# Patient Record
Sex: MALE | Race: BLACK OR AFRICAN AMERICAN | Employment: STUDENT | URBAN - NONMETROPOLITAN AREA
[De-identification: names, ages, dates, MRNs, and addresses within clinical notes are randomized per-mention and may not be internally consistent; named-entity substitution may affect disease eponyms.]

---

## 2017-02-28 DIAGNOSIS — S76.311D HAMSTRING STRAIN, RIGHT, SUBSEQUENT ENCOUNTER: Primary | ICD-10-CM

## 2017-04-07 ENCOUNTER — HOSPITAL ENCOUNTER (OUTPATIENT)
Dept: PHYSICAL THERAPY | Age: 22
Discharge: HOME | End: 2017-04-07
Admitting: ORTHOPAEDIC SURGERY

## 2017-04-07 ENCOUNTER — HOSPITAL ENCOUNTER (OUTPATIENT)
Dept: OTHER | Age: 22
Discharge: HOME | End: 2017-04-30
Attending: ORTHOPAEDIC SURGERY | Admitting: ORTHOPAEDIC SURGERY

## 2017-04-10 ENCOUNTER — HOSPITAL ENCOUNTER (OUTPATIENT)
Dept: PHYSICAL THERAPY | Age: 22
Discharge: HOME | End: 2017-04-10
Admitting: ORTHOPAEDIC SURGERY

## 2017-04-14 ENCOUNTER — HOSPITAL ENCOUNTER (OUTPATIENT)
Dept: PHYSICAL THERAPY | Age: 22
Discharge: HOME | End: 2017-04-14
Admitting: ORTHOPAEDIC SURGERY

## 2017-04-21 ENCOUNTER — HOSPITAL ENCOUNTER (OUTPATIENT)
Dept: PHYSICAL THERAPY | Age: 22
Discharge: HOME | End: 2017-04-21
Admitting: ORTHOPAEDIC SURGERY

## 2023-07-12 ENCOUNTER — OFFICE VISIT (OUTPATIENT)
Dept: FAMILY MEDICINE | Facility: CLINIC | Age: 28
End: 2023-07-12
Payer: COMMERCIAL

## 2023-07-12 VITALS
RESPIRATION RATE: 14 BRPM | OXYGEN SATURATION: 99 % | SYSTOLIC BLOOD PRESSURE: 122 MMHG | TEMPERATURE: 98.1 F | BODY MASS INDEX: 23.84 KG/M2 | WEIGHT: 166.5 LBS | HEART RATE: 78 BPM | DIASTOLIC BLOOD PRESSURE: 75 MMHG | HEIGHT: 70 IN

## 2023-07-12 DIAGNOSIS — Z11.3 ROUTINE SCREENING FOR STI (SEXUALLY TRANSMITTED INFECTION): Primary | ICD-10-CM

## 2023-07-12 DIAGNOSIS — Z13.220 LIPID SCREENING: ICD-10-CM

## 2023-07-12 DIAGNOSIS — Z00.00 ROUTINE GENERAL MEDICAL EXAMINATION AT A HEALTH CARE FACILITY: ICD-10-CM

## 2023-07-12 PROCEDURE — 99385 PREV VISIT NEW AGE 18-39: CPT | Performed by: FAMILY MEDICINE

## 2023-07-12 ASSESSMENT — ENCOUNTER SYMPTOMS
ABDOMINAL PAIN: 0
PALPITATIONS: 0
ARTHRALGIAS: 0
DIZZINESS: 0
DIARRHEA: 0
WEAKNESS: 0
CONSTIPATION: 0
HEMATOCHEZIA: 0
FEVER: 0
SHORTNESS OF BREATH: 0
HEARTBURN: 0
SORE THROAT: 0
MYALGIAS: 0
NAUSEA: 0
CHILLS: 0
PARESTHESIAS: 0
HEMATURIA: 0
COUGH: 0
DYSURIA: 0
NERVOUS/ANXIOUS: 0
HEADACHES: 0
EYE PAIN: 0
JOINT SWELLING: 0
FREQUENCY: 0

## 2023-07-12 NOTE — PROGRESS NOTES
SUBJECTIVE:   CC: Niyah is an 27 year old who presents for preventative health visit.       7/12/2023     3:04 PM   Additional Questions   Roomed by Cinthia     Healthy Habits:     Getting at least 3 servings of Calcium per day:  Yes    Bi-annual eye exam:  NO    Dental care twice a year:  NO    Sleep apnea or symptoms of sleep apnea:  None    Diet:  Regular (no restrictions)    Frequency of exercise:  2-3 days/week    Duration of exercise:  30-45 minutes    Taking medications regularly:  Yes    Medication side effects:  Not applicable    Additional concerns today:  No        Today's PHQ-2 Score:       7/12/2023     2:52 PM   PHQ-2 ( 1999 [employer])   Q1: Little interest or pleasure in doing things 0   Q2: Feeling down, depressed or hopeless 0   PHQ-2 Score 0   Q1: Little interest or pleasure in doing things Not at all   Q2: Feeling down, depressed or hopeless Not at all   PHQ-2 Score 0       Social History     Tobacco Use    Smoking status: Some Days     Types: Cigarettes    Smokeless tobacco: Never    Tobacco comments:     Occasionally smokes   Substance Use Topics    Alcohol use: Yes     Comment: once every 3 weeks- sometime inibriated             7/12/2023     2:52 PM   Alcohol Use   Prescreen: >3 drinks/day or >7 drinks/week? No          No data to display                Last PSA: No results found for: PSA    Reviewed orders with patient. Reviewed health maintenance and updated orders accordingly - Yes    Reviewed and updated as needed this visit by clinical staff   Tobacco  Allergies  Meds   Med Hx  Surg Hx  Fam Hx  Soc Hx        Reviewed and updated as needed this visit by Provider   Tobacco     Med Hx  Surg Hx  Fam Hx  Soc Hx         Review of Systems   Constitutional:  Negative for chills and fever.   HENT:  Negative for congestion, ear pain, hearing loss and sore throat.    Eyes:  Negative for pain and visual disturbance.   Respiratory:  Negative for cough and shortness of breath.    Cardiovascular:  " Negative for chest pain, palpitations and peripheral edema.   Gastrointestinal:  Negative for abdominal pain, constipation, diarrhea, heartburn, hematochezia and nausea.   Genitourinary:  Negative for dysuria, frequency, genital sores, hematuria and urgency.   Musculoskeletal:  Negative for arthralgias, joint swelling and myalgias.   Neurological:  Negative for dizziness, weakness, headaches and paresthesias.   Psychiatric/Behavioral:  Negative for mood changes. The patient is not nervous/anxious.          OBJECTIVE:   /75 (BP Location: Right arm, Patient Position: Sitting, Cuff Size: Adult Regular)   Pulse 78   Temp 98.1  F (36.7  C) (Temporal)   Resp 14   Ht 1.778 m (5' 10\")   Wt 75.5 kg (166 lb 8 oz)   SpO2 99%   BMI 23.89 kg/m      Physical Exam  Constitutional:       General: He is not in acute distress.     Appearance: Normal appearance.   HENT:      Head: Normocephalic.      Right Ear: Tympanic membrane, ear canal and external ear normal.      Left Ear: Tympanic membrane, ear canal and external ear normal.      Mouth/Throat:      Mouth: Mucous membranes are moist.      Pharynx: No oropharyngeal exudate or posterior oropharyngeal erythema.   Eyes:      General: No scleral icterus.  Cardiovascular:      Rate and Rhythm: Normal rate and regular rhythm.      Heart sounds: No murmur heard.  Pulmonary:      Effort: Pulmonary effort is normal. No respiratory distress.      Breath sounds: Normal breath sounds.   Abdominal:      General: Abdomen is flat. Bowel sounds are normal. There is no distension.      Palpations: Abdomen is soft. There is no mass.      Hernia: No hernia is present.   Lymphadenopathy:      Cervical: No cervical adenopathy.   Neurological:      General: No focal deficit present.      Mental Status: He is alert.   Psychiatric:         Mood and Affect: Mood normal.         Behavior: Behavior normal.           ASSESSMENT/PLAN:       ICD-10-CM    1. Routine screening for STI (sexually " transmitted infection)  Z11.3 HIV Antigen Antibody Combo     Treponema Abs w Reflex to RPR and Titer      2. Lipid screening  Z13.220 Lipid panel reflex to direct LDL Fasting      3. Routine general medical examination at a health care facility  Z00.00           Patient has been advised of split billing requirements and indicates understanding: No      COUNSELING:   Reviewed preventive health counseling, as reflected in patient instructions        He reports that he has been smoking cigarettes. He has never used smokeless tobacco.  Nicotine/Tobacco Cessation Plan:   Information offered: Patient not interested at this time            Tony Martinez DO  [facility]

## 2023-09-20 ENCOUNTER — VIRTUAL VISIT (OUTPATIENT)
Dept: BEHAVIORAL HEALTH | Facility: CLINIC | Age: 28
End: 2023-09-20
Payer: COMMERCIAL

## 2023-09-20 DIAGNOSIS — F41.9 ANXIETY DISORDER, UNSPECIFIED TYPE: Primary | ICD-10-CM

## 2023-09-20 PROCEDURE — 90834 PSYTX W PT 45 MINUTES: CPT | Mod: VID

## 2023-09-20 ASSESSMENT — PATIENT HEALTH QUESTIONNAIRE - PHQ9
SUM OF ALL RESPONSES TO PHQ QUESTIONS 1-9: 9
SUM OF ALL RESPONSES TO PHQ QUESTIONS 1-9: 9
10. IF YOU CHECKED OFF ANY PROBLEMS, HOW DIFFICULT HAVE THESE PROBLEMS MADE IT FOR YOU TO DO YOUR WORK, TAKE CARE OF THINGS AT HOME, OR GET ALONG WITH OTHER PEOPLE: VERY DIFFICULT

## 2023-09-20 ASSESSMENT — COLUMBIA-SUICIDE SEVERITY RATING SCALE - C-SSRS
2. HAVE YOU ACTUALLY HAD ANY THOUGHTS OF KILLING YOURSELF?: NO
6. HAVE YOU EVER DONE ANYTHING, STARTED TO DO ANYTHING, OR PREPARED TO DO ANYTHING TO END YOUR LIFE?: NO
1. HAVE YOU WISHED YOU WERE DEAD OR WISHED YOU COULD GO TO SLEEP AND NOT WAKE UP?: NO
ATTEMPT LIFETIME: NO
TOTAL  NUMBER OF INTERRUPTED ATTEMPTS LIFETIME: NO
TOTAL  NUMBER OF ABORTED OR SELF INTERRUPTED ATTEMPTS LIFETIME: NO

## 2023-09-20 NOTE — PROGRESS NOTES
[facility] Primary Care: Integrated Behavioral Health  2023      Behavioral Health Clinician Progress Note    Patient Name: Niyah Kincaid           Service Type:  Individual      Service Location:   Face to Face in Clinic     Session Start Time: 10:02 AM  Session End Time: 10:54 AM      Session Length: 38 - 52      Attendees: Patient     Service Modality:  Video Visit:      Provider verified identity through the following two step process.  Patient provided:  Patient  and Patient address    Telemedicine Visit: The patient's condition can be safely assessed and treated via synchronous audio and visual telemedicine encounter.      Reason for Telemedicine Visit: Patient has requested telehealth visit    Originating Site (Patient Location): Patient's home    Distant Site (Provider Location): [facility]    Consent:  The patient/guardian has verbally consented to: the potential risks and benefits of telemedicine (video visit) versus in person care; bill my insurance or make self-payment for services provided; and responsibility for payment of non-covered services.     Patient would like the video invitation sent by:  My Chart    Mode of Communication:  [facility]    Distant Location (Provider):  On-site    As the provider I attest to compliance with applicable laws and regulations related to telemedicine.    Visit Activities (Refresh list every visit): NEW and [facility] Only    Diagnostic Assessment Date: TBD  Treatment Plan Review Date: TBD  See Flowsheets for today's PHQ-9 and CONRAD-7 results  Previous PHQ-9:       2023     9:47 AM   PHQ-9 SCORE   PHQ-9 Total Score MyChart 9 (Mild depression)   PHQ-9 Total Score 9       GENERALIZED ANXIETY DISORDER SCALE    Over the last 2 weeks, how often have you been bothered by the following problems?    1. Feeling nervous, anxious, or on edge - Several Days  2. Not being able to stop or control  worrying - Several Days    CONRAD 2 Total Score - 2    Developed by Lottie Clement, Wendy Hdz, Giorgi Olivarez and colleagues, with an educational bright from [employer] No permission required to reproduce, translate, display or distribute.      [place] Suicide Severity Rating Scale (Lifetime/Recent)      9/20/2023    12:33 PM   [place] Suicide Severity Rating (Lifetime/Recent)   Q1 Wish to be Dead (Lifetime) N   Q2 Non-Specific Active Suicidal Thoughts (Lifetime) N   Actual Attempt (Lifetime) N   Has subject engaged in non-suicidal self-injurious behavior? (Lifetime) N   Interrupted Attempts (Lifetime) N   Aborted or Self-Interrupted Attempt (Lifetime) N   Preparatory Acts or Behavior (Lifetime) N   Calculated C-SSRS Risk Score (Lifetime/Recent) No Risk Indicated     DATA  Extended Session (60+ minutes): No  Interactive Complexity: No  Crisis: No  [facility] Patient: No    Treatment Objective(s) Addressed in This Session:  Target Behavior(s):  communication     Relationship Problems: will address relationship difficulties in a more adaptive manner    Current Stressors / Issues:  [facility] met with pt virtually on this date. Pt states that he has concerns with communicating with significant other. Pt reports that his significant other has difficulty with accountability, this makes him increasingly angry. Pt states that he can get loud and reactive.  Pt states that he has a hard time letting things go and would like to address these concerns. [facility] and pt discussed the possibility of couples counseling. Pt states that he would also like to work on communication with others.     Pt states that he struggles with attention. Pt states that it is difficult for him to stay on task. Pt has never been tested for ADHD/ADD. Pt states that he puts things off and does not execute tasks as he would like. Pt acknowledges that he is starting a new job and has some anxiety in this regard.     Psychiatric hx: no hospitalizations, no  formal diagnosis. Pt denies SI/HI/SIB. Pt denies a hx of safety concerns.     Progress on Treatment Objective(s) / Homework:  New Objective established this session - PREPARATION (Decided to change - considering how); Intervened by negotiating a change plan and determining options / strategies for behavior change, identifying triggers, exploring social supports, and working towards setting a date to begin behavior change    Motivational Interviewing    MI Intervention: Open-ended questions and Reflections: simple and complex     Change Talk Expressed by the Patient: Desire to change Reasons to change    Provider Response to Change Talk: E - Evoked more info from patient about behavior change, A - Affirmed patient's thoughts, decisions, or attempts at behavior change, R - Reflected patient's change talk, and S - Summarized patient's change talk statements    Also provided psychoeducation about behavioral health condition, symptoms, and treatment options    Care Plan review completed: No    Medication Review:  No current psychiatric medications prescribed    Medication Compliance:  NA    Changes in Health Issues:   None reported    Chemical Use Review:   Substance Use: Chemical use reviewed, no active concerns identified      Tobacco Use: No current tobacco use.      Assessment: Current Emotional / Mental Status (status of significant symptoms):  Risk status (Self / Other harm or suicidal ideation)  Patient denies a history of suicidal ideation, suicide attempts, self-injurious behavior, homicidal ideation, homicidal behavior, and and other safety concerns  Patient denies current fears or concerns for personal safety.  Patient denies current or recent suicidal ideation or behaviors.  Patient denies current or recent homicidal ideation or behaviors.  Patient denies current or recent self injurious behavior or ideation.  Patient denies other safety concerns.  A safety and risk management plan has not been developed at  this time, however patient was encouraged to call [facility] should there be a change in any of these risk factors.    Appearance:   Appropriate   Eye Contact:   Good   Psychomotor Behavior: NEISHA   Attitude:   Cooperative  Interested Pleasant  Orientation:   All  Speech   Rate / Production: Normal    Volume:  Normal   Mood:    Euthymic  Affect:    Appropriate   Thought Content:  Clear   Thought Form:  Coherent  Logical   Insight:    Good     Diagnoses:  1. Anxiety disorder, unspecified type    Provisional as DA is not yet completed     Collateral Reports Completed:  Not Applicable    Plan: (Homework, other):  Patient was given information about behavioral services and encouraged to schedule a follow up appointment with the clinic [facility] in 2 weeks.  He was also given information about mental health symptoms and treatment options .  CD Recommendations: No indications of CD issues.       GERRY Prater, [facility]  Behavioral Health Clinician  [facility], [address], [address], [address], [place], [address]  [phone]      2023

## 2024-10-06 ENCOUNTER — HEALTH MAINTENANCE LETTER (OUTPATIENT)
Age: 29
End: 2024-10-06